# Patient Record
Sex: FEMALE | URBAN - METROPOLITAN AREA
[De-identification: names, ages, dates, MRNs, and addresses within clinical notes are randomized per-mention and may not be internally consistent; named-entity substitution may affect disease eponyms.]

---

## 2022-12-15 ENCOUNTER — OFFICE VISIT (OUTPATIENT)
Dept: URBAN - METROPOLITAN AREA CLINIC 33 | Facility: CLINIC | Age: 22
End: 2022-12-15
Payer: COMMERCIAL

## 2022-12-15 DIAGNOSIS — H04.123 DRY EYE SYNDROME OF BILATERAL LACRIMAL GLANDS: ICD-10-CM

## 2022-12-15 DIAGNOSIS — H16.261: Primary | ICD-10-CM

## 2022-12-15 PROCEDURE — 92004 COMPRE OPH EXAM NEW PT 1/>: CPT

## 2022-12-15 RX ORDER — KETOTIFEN FUMARATE 0.35 MG/ML
SOLUTION/ DROPS OPHTHALMIC
Qty: 5 | Refills: 0 | Status: ACTIVE
Start: 2022-12-15

## 2022-12-15 ASSESSMENT — INTRAOCULAR PRESSURE
OS: 16
OD: 16

## 2022-12-15 NOTE — IMPRESSION/PLAN
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
-started 4 days ago Plan: Patient educated on findings. Recommended patient to use ketotifen BID OS, artificial tears QID OU, and to perform cold compresses TID OU. Return to clinic if symptoms worsens, decrease in vision, or pain.